# Patient Record
Sex: FEMALE | Race: WHITE | NOT HISPANIC OR LATINO | Employment: OTHER | URBAN - NONMETROPOLITAN AREA
[De-identification: names, ages, dates, MRNs, and addresses within clinical notes are randomized per-mention and may not be internally consistent; named-entity substitution may affect disease eponyms.]

---

## 2021-08-04 PROCEDURE — 78452 HT MUSCLE IMAGE SPECT MULT: CPT | Performed by: INTERNAL MEDICINE

## 2021-08-04 PROCEDURE — 93018 CV STRESS TEST I&R ONLY: CPT | Performed by: INTERNAL MEDICINE

## 2021-08-05 ENCOUNTER — OUTSIDE FACILITY SERVICE (OUTPATIENT)
Dept: CARDIOLOGY | Facility: CLINIC | Age: 76
End: 2021-08-05

## 2023-12-05 ENCOUNTER — OFFICE VISIT (OUTPATIENT)
Dept: CARDIOLOGY | Facility: CLINIC | Age: 78
End: 2023-12-05
Payer: MEDICARE

## 2023-12-05 VITALS
DIASTOLIC BLOOD PRESSURE: 80 MMHG | WEIGHT: 157 LBS | HEART RATE: 102 BPM | OXYGEN SATURATION: 96 % | SYSTOLIC BLOOD PRESSURE: 146 MMHG

## 2023-12-05 DIAGNOSIS — I25.10 CORONARY ARTERY DISEASE INVOLVING NATIVE CORONARY ARTERY OF NATIVE HEART WITHOUT ANGINA PECTORIS: Primary | ICD-10-CM

## 2023-12-05 DIAGNOSIS — I49.5 SICK SINUS SYNDROME: ICD-10-CM

## 2023-12-05 DIAGNOSIS — E78.00 PURE HYPERCHOLESTEROLEMIA: ICD-10-CM

## 2023-12-05 PROCEDURE — 99204 OFFICE O/P NEW MOD 45 MIN: CPT | Performed by: PHYSICIAN ASSISTANT

## 2023-12-05 RX ORDER — LEVOTHYROXINE SODIUM 0.07 MG/1
75 TABLET ORAL DAILY
COMMUNITY

## 2023-12-05 RX ORDER — ASPIRIN 81 MG/1
81 TABLET ORAL DAILY
COMMUNITY

## 2023-12-05 RX ORDER — VERAPAMIL HYDROCHLORIDE 360 MG/1
360 CAPSULE, DELAYED RELEASE PELLETS ORAL DAILY
COMMUNITY

## 2023-12-05 NOTE — LETTER
December 5, 2023       No Recipients    Patient: Natasha Ackerman   YOB: 1945   Date of Visit: 12/5/2023       Dear Nata Wynne MD    Natasha Ackerman was in my office today. Below is a copy of my note.    If you have questions, please do not hesitate to call me. I look forward to following Natasha along with you.         Sincerely,        MAIN Valero        CC:   No Recipients    Problem list     Subjective  Natasha Ackerman is a 78 y.o. female     Chief Complaint   Patient presents with   • Establish Care   Problem list  1.  Coronary artery disease  1.1 cardiac catheterization in 2021 with stenting times one of the second diagonal of the LAD.  Patient had abnormal stress test with an anterior ischemic defect.  Preserved LV function noted  2.  Sick sinus syndrome status post [employer] pacemaker implant in approximately 2018  3.  Hypothyroidism  4.  Hypertension    HPI    Patient is a an 78-year-old female that presents to the office to be evaluated.  Patient has seen cardiology locally with Dr. Hui's office but wants to establish care here today.    Clinically, she feels remarkable.  She does not describe any chest pain or pressure.  She does not describe any shortness of breath.  No complaints of PND or orthopnea.    She does not palpitate nor does she complain of dysrhythmic symptoms.  She is stable otherwise.      Current Outpatient Medications on File Prior to Visit   Medication Sig Dispense Refill   • Acetaminophen (TYLENOL ARTHRITIS PAIN PO) Take  by mouth.     • Ascorbic Acid (VITAMIN C ER PO) Take  by mouth.     • aspirin 81 MG EC tablet Take 1 tablet by mouth Daily.     • CALCIUM PO Take  by mouth.     • levothyroxine (SYNTHROID, LEVOTHROID) 75 MCG tablet Take 1 tablet by mouth Daily.     • verapamil ER (VERELAN) 360 MG 24 hr capsule Take 1 capsule by mouth Daily.       No current facility-administered medications on file prior to visit.       Cipro [ciprofloxacin  hcl], Azithromycin, Hydrocodone, Morphine, Noroxin [norfloxacin], Tramadol, Valium [diazepam], and Zocor [simvastatin]    Past Medical History:   Diagnosis Date   • Hypertension        Social History     Socioeconomic History   • Marital status:    Tobacco Use   • Smoking status: Never   • Smokeless tobacco: Never   Substance and Sexual Activity   • Alcohol use: Never   • Drug use: Never   • Sexual activity: Defer       Family History   Family history unknown: Yes       Review of Systems   Constitutional: Negative.    HENT: Negative.     Eyes: Negative.  Negative for visual disturbance.   Respiratory:  Positive for cough. Negative for apnea, chest tightness, shortness of breath and wheezing.    Cardiovascular: Negative.  Negative for chest pain, palpitations and leg swelling.   Gastrointestinal: Negative.  Negative for blood in stool.   Endocrine: Negative.    Genitourinary:  Negative for hematuria.   Musculoskeletal: Negative.    Skin: Negative.  Negative for color change, rash and wound.   Allergic/Immunologic: Negative.    Neurological:  Negative for dizziness, syncope, weakness, light-headedness, numbness and headaches.   Hematological:  Bruises/bleeds easily.   Psychiatric/Behavioral: Negative.  Negative for sleep disturbance.        Objective  Vitals:    12/05/23 1346   BP: 146/80   BP Location: Left arm   Patient Position: Sitting   Cuff Size: Adult   Pulse: 102   SpO2: 96%   Weight: 71.2 kg (157 lb)      /80 (BP Location: Left arm, Patient Position: Sitting, Cuff Size: Adult)   Pulse 102   Wt 71.2 kg (157 lb)   SpO2 96%     Lab Results (most recent)       None            Physical Exam  Vitals and nursing note reviewed.   Constitutional:       General: She is not in acute distress.     Appearance: Normal appearance. She is well-developed.   HENT:      Head: Normocephalic and atraumatic.   Eyes:      General: No scleral icterus.        Right eye: No discharge.         Left eye: No discharge.       Conjunctiva/sclera: Conjunctivae normal.   Neck:      Vascular: No carotid bruit.   Cardiovascular:      Rate and Rhythm: Normal rate and regular rhythm.      Heart sounds: Normal heart sounds. No murmur heard.     No friction rub. No gallop.   Pulmonary:      Effort: Pulmonary effort is normal. No respiratory distress.      Breath sounds: Normal breath sounds. No wheezing or rales.   Chest:      Chest wall: No tenderness.   Musculoskeletal:      Right lower leg: No edema.      Left lower leg: No edema.   Skin:     General: Skin is warm and dry.      Coloration: Skin is not pale.      Findings: No erythema or rash.   Neurological:      Mental Status: She is alert and oriented to person, place, and time.      Cranial Nerves: No cranial nerve deficit.   Psychiatric:         Behavior: Behavior normal.         Procedure  Procedures       Assessment & Plan    Problems Addressed this Visit          Cardiac and Vasculature    Coronary artery disease involving native coronary artery of native heart without angina pectoris - Primary    Relevant Medications    verapamil ER (VERELAN) 360 MG 24 hr capsule    Pure hypercholesterolemia    Sick sinus syndrome    Relevant Medications    verapamil ER (VERELAN) 360 MG 24 hr capsule     Diagnoses         Codes Comments    Coronary artery disease involving native coronary artery of native heart without angina pectoris    -  Primary ICD-10-CM: I25.10  ICD-9-CM: 414.01     Pure hypercholesterolemia     ICD-10-CM: E78.00  ICD-9-CM: 272.0     Sick sinus syndrome     ICD-10-CM: I49.5  ICD-9-CM: 427.81           Recommendation  1.  Patient is an 78-year-old female who presents to the office to be evaluated.  Patient has history of coronary disease but feels stable.  She has no angina.  For now, we can monitor and continue current medical regimen.  Apparently there has been some issue with statin therapy as it is listed on an allergy.  Hopefully, we can obtain some labs in regards to her  lipid status.  Medication such as Repatha can be used for lipid management.    2.  Patient with sick sinus syndrome that is doing well with pacemaker implant.  We will schedule for routine pacemaker interrogation.    3.  Patient with dyslipidemia as above.  We will monitor.  We can see her back for follow-up in 6 months or sooner if needed.  Follow-up with primary as scheduled.           Patient brought in medicine list to appointment, it's been reviewed with patient and med list was updated in the chart.      Advance Care Planning  ACP discussion was declined by the patient. Patient does not have an advance directive, declines further assistance.      Electronically signed by:

## 2023-12-05 NOTE — PROGRESS NOTES
Problem list     Subjective   Natasha Ackerman is a 78 y.o. female     Chief Complaint   Patient presents with    Establish Care   Problem list  1.  Coronary artery disease  1.1 cardiac catheterization in 2021 with stenting times one of the second diagonal of the LAD.  Patient had abnormal stress test with an anterior ischemic defect.  Preserved LV function noted  2.  Sick sinus syndrome status post [employer] pacemaker implant in approximately 2018  3.  Hypothyroidism  4.  Hypertension    HPI    Patient is a an 78-year-old female that presents to the office to be evaluated.  Patient has seen cardiology locally with Dr. Hui's office but wants to establish care here today.    Clinically, she feels remarkable.  She does not describe any chest pain or pressure.  She does not describe any shortness of breath.  No complaints of PND or orthopnea.    She does not palpitate nor does she complain of dysrhythmic symptoms.  She is stable otherwise.      Current Outpatient Medications on File Prior to Visit   Medication Sig Dispense Refill    Acetaminophen (TYLENOL ARTHRITIS PAIN PO) Take  by mouth.      Ascorbic Acid (VITAMIN C ER PO) Take  by mouth.      aspirin 81 MG EC tablet Take 1 tablet by mouth Daily.      CALCIUM PO Take  by mouth.      levothyroxine (SYNTHROID, LEVOTHROID) 75 MCG tablet Take 1 tablet by mouth Daily.      verapamil ER (VERELAN) 360 MG 24 hr capsule Take 1 capsule by mouth Daily.       No current facility-administered medications on file prior to visit.       Cipro [ciprofloxacin hcl], Azithromycin, Hydrocodone, Morphine, Noroxin [norfloxacin], Tramadol, Valium [diazepam], and Zocor [simvastatin]    Past Medical History:   Diagnosis Date    Hypertension        Social History     Socioeconomic History    Marital status:    Tobacco Use    Smoking status: Never    Smokeless tobacco: Never   Substance and Sexual Activity    Alcohol use: Never    Drug use: Never    Sexual activity: Defer        Family History   Family history unknown: Yes       Review of Systems   Constitutional: Negative.    HENT: Negative.     Eyes: Negative.  Negative for visual disturbance.   Respiratory:  Positive for cough. Negative for apnea, chest tightness, shortness of breath and wheezing.    Cardiovascular: Negative.  Negative for chest pain, palpitations and leg swelling.   Gastrointestinal: Negative.  Negative for blood in stool.   Endocrine: Negative.    Genitourinary:  Negative for hematuria.   Musculoskeletal: Negative.    Skin: Negative.  Negative for color change, rash and wound.   Allergic/Immunologic: Negative.    Neurological:  Negative for dizziness, syncope, weakness, light-headedness, numbness and headaches.   Hematological:  Bruises/bleeds easily.   Psychiatric/Behavioral: Negative.  Negative for sleep disturbance.        Objective   Vitals:    12/05/23 1346   BP: 146/80   BP Location: Left arm   Patient Position: Sitting   Cuff Size: Adult   Pulse: 102   SpO2: 96%   Weight: 71.2 kg (157 lb)      /80 (BP Location: Left arm, Patient Position: Sitting, Cuff Size: Adult)   Pulse 102   Wt 71.2 kg (157 lb)   SpO2 96%     Lab Results (most recent)       None            Physical Exam  Vitals and nursing note reviewed.   Constitutional:       General: She is not in acute distress.     Appearance: Normal appearance. She is well-developed.   HENT:      Head: Normocephalic and atraumatic.   Eyes:      General: No scleral icterus.        Right eye: No discharge.         Left eye: No discharge.      Conjunctiva/sclera: Conjunctivae normal.   Neck:      Vascular: No carotid bruit.   Cardiovascular:      Rate and Rhythm: Normal rate and regular rhythm.      Heart sounds: Normal heart sounds. No murmur heard.     No friction rub. No gallop.   Pulmonary:      Effort: Pulmonary effort is normal. No respiratory distress.      Breath sounds: Normal breath sounds. No wheezing or rales.   Chest:      Chest wall: No  tenderness.   Musculoskeletal:      Right lower leg: No edema.      Left lower leg: No edema.   Skin:     General: Skin is warm and dry.      Coloration: Skin is not pale.      Findings: No erythema or rash.   Neurological:      Mental Status: She is alert and oriented to person, place, and time.      Cranial Nerves: No cranial nerve deficit.   Psychiatric:         Behavior: Behavior normal.         Procedure   Procedures       Assessment & Plan     Problems Addressed this Visit          Cardiac and Vasculature    Coronary artery disease involving native coronary artery of native heart without angina pectoris - Primary    Relevant Medications    verapamil ER (VERELAN) 360 MG 24 hr capsule    Pure hypercholesterolemia    Sick sinus syndrome    Relevant Medications    verapamil ER (VERELAN) 360 MG 24 hr capsule     Diagnoses         Codes Comments    Coronary artery disease involving native coronary artery of native heart without angina pectoris    -  Primary ICD-10-CM: I25.10  ICD-9-CM: 414.01     Pure hypercholesterolemia     ICD-10-CM: E78.00  ICD-9-CM: 272.0     Sick sinus syndrome     ICD-10-CM: I49.5  ICD-9-CM: 427.81           Recommendation  1.  Patient is an 78-year-old female who presents to the office to be evaluated.  Patient has history of coronary disease but feels stable.  She has no angina.  For now, we can monitor and continue current medical regimen.  Apparently there has been some issue with statin therapy as it is listed on an allergy.  Hopefully, we can obtain some labs in regards to her lipid status.  Medication such as Repatha can be used for lipid management.    2.  Patient with sick sinus syndrome that is doing well with pacemaker implant.  We will schedule for routine pacemaker interrogation.    3.  Patient with dyslipidemia as above.  We will monitor.  We can see her back for follow-up in 6 months or sooner if needed.  Follow-up with primary as scheduled.           Patient brought in  medicine list to appointment, it's been reviewed with patient and med list was updated in the chart.      Advance Care Planning   ACP discussion was declined by the patient. Patient does not have an advance directive, declines further assistance.      Electronically signed by:

## 2024-01-05 ENCOUNTER — OFFICE VISIT (OUTPATIENT)
Dept: CARDIOLOGY | Facility: CLINIC | Age: 79
End: 2024-01-05
Payer: MEDICARE

## 2024-01-05 DIAGNOSIS — I49.5 SICK SINUS SYNDROME: Primary | ICD-10-CM

## 2024-05-14 ENCOUNTER — TELEPHONE (OUTPATIENT)
Dept: CARDIOLOGY | Facility: CLINIC | Age: 79
End: 2024-05-14
Payer: MEDICARE

## 2024-05-14 NOTE — TELEPHONE ENCOUNTER
Received cardiac clearance request from [facility] stating pt has L L4-5 HLD and is requiring a cardiac clearance. Placed cardiac clearance request in YAHIR'S inbox to review and address with provider.

## 2024-05-14 NOTE — LETTER
May 16, 2024           To Whom It May Concern:    We build our practice on integrity and patient care. The highest compliment we can receive is the referral of friends, family and patients to our office. We want to take this time to thank you for considering our group as part of your care team.    The medical records for Natasha Ackerman 1945 were reviewed for pre-operative clearance on 05/16/24. It has been determined that this patient has:  ACCEPTABLE RISK.    Natasha Ackerman is currently on the following medications that will need to be held prior to surgery: CONTINUE ASA IF POSSIBLE. HX OF STENTING.    This pre-operative evaluation has been completed based on patient reported medical conditions at the date of this letter, this evaluation may have considered in part results of additional testing, completion of an EKG and patient reported symptoms at the time of their visit.    Natasha Ackerman's pre-operative clearance is good for thirty(30) days from the date of this letter with no reported changes in health, symptoms or diagnosis from the patient, their primary care provider or your office.    Your office has reported the patient will under go FOR L-L4-5 HLD on TBD with [facility]. If this appointment is changed or moved outside of thirty(30) days from 05/16/24 this pre-operative clearance is void.    If you have any further questions please give our office a call.    Thank you for your trust,      MAIN Lewis

## 2024-06-05 ENCOUNTER — OFFICE VISIT (OUTPATIENT)
Dept: CARDIOLOGY | Facility: CLINIC | Age: 79
End: 2024-06-05
Payer: MEDICARE

## 2024-06-05 VITALS — DIASTOLIC BLOOD PRESSURE: 63 MMHG | OXYGEN SATURATION: 96 % | HEART RATE: 90 BPM | SYSTOLIC BLOOD PRESSURE: 122 MMHG

## 2024-06-05 DIAGNOSIS — I49.5 SICK SINUS SYNDROME: ICD-10-CM

## 2024-06-05 DIAGNOSIS — E78.00 PURE HYPERCHOLESTEROLEMIA: ICD-10-CM

## 2024-06-05 DIAGNOSIS — I25.10 CORONARY ARTERY DISEASE INVOLVING NATIVE CORONARY ARTERY OF NATIVE HEART WITHOUT ANGINA PECTORIS: Primary | ICD-10-CM

## 2024-06-05 PROCEDURE — 99214 OFFICE O/P EST MOD 30 MIN: CPT | Performed by: PHYSICIAN ASSISTANT

## 2024-06-05 RX ORDER — AMITRIPTYLINE HYDROCHLORIDE 25 MG/1
25 TABLET, FILM COATED ORAL NIGHTLY
COMMUNITY

## 2024-06-05 RX ORDER — FAMOTIDINE 40 MG/1
40 TABLET, FILM COATED ORAL DAILY
COMMUNITY

## 2024-06-05 RX ORDER — ACETAMINOPHEN AND CODEINE PHOSPHATE 300; 60 MG/1; MG/1
1 TABLET ORAL 3 TIMES DAILY
COMMUNITY
Start: 2024-05-23

## 2024-06-05 NOTE — LETTER
June 5, 2024       No Recipients    Patient: Natasha Ackerman   YOB: 1945   Date of Visit: 6/5/2024       Dear aNta Wynne MD    Natasha Ackerman was in my office today. Below is a copy of my note.    If you have questions, please do not hesitate to call me. I look forward to following Natasha along with you.         Sincerely,        MAIN Valero        CC:   No Recipients    Problem list     Subjective  Natasha Ackerman is a 78 y.o. female     Chief Complaint   Patient presents with   • 6 MONTHS     CAD     Problem list  1.  Coronary artery disease  1.1 cardiac catheterization in 2021 with stenting times one of the second diagonal of the LAD.  Patient had abnormal stress test with an anterior ischemic defect.  Preserved LV function noted  2.  Sick sinus syndrome status post [employer] pacemaker implant in approximately 2018  3.  Hypothyroidism  4.  Hypertension     HPI    Patient is an 78-year-old female who presents to the office for evaluation.  Patient does not complain of any chest pain or pressure.  No shortness of breath.  No PND or orthopnea.    No complaints of palpitations or dysrhythmic symptoms.  Patient has mild lower extremity edema.  Patient is stable otherwise.        Current Outpatient Medications on File Prior to Visit   Medication Sig Dispense Refill   • Acetaminophen (TYLENOL ARTHRITIS PAIN PO) Take  by mouth.     • acetaminophen-codeine (TYLENOL with CODEINE #4) 300-60 MG per tablet Take 1 tablet by mouth 3 times a day.     • amitriptyline (ELAVIL) 25 MG tablet Take 1 tablet by mouth Every Night.     • Ascorbic Acid (VITAMIN C ER PO) Take  by mouth.     • aspirin 81 MG EC tablet Take 1 tablet by mouth Daily.     • CALCIUM PO Take  by mouth.     • famotidine (PEPCID) 40 MG tablet Take 1 tablet by mouth Daily.     • levothyroxine (SYNTHROID, LEVOTHROID) 75 MCG tablet Take 1 tablet by mouth Daily.     • verapamil SR (CALAN-SR) 240 MG CR tablet Take 1 tablet by mouth  Daily.       No current facility-administered medications on file prior to visit.       Cipro [ciprofloxacin hcl], Azithromycin, Hydrocodone, Morphine, Noroxin [norfloxacin], Tramadol, Valium [diazepam], and Zocor [simvastatin]    Past Medical History:   Diagnosis Date   • Hypertension        Social History     Socioeconomic History   • Marital status:    Tobacco Use   • Smoking status: Never   • Smokeless tobacco: Never   Substance and Sexual Activity   • Alcohol use: Never   • Drug use: Never   • Sexual activity: Defer       Family History   Family history unknown: Yes       Review of Systems   Respiratory:  Negative for shortness of breath.    Cardiovascular:  Positive for leg swelling. Negative for chest pain and palpitations.   Neurological:  Negative for syncope.       Objective  Vitals:    06/05/24 1109   BP: 122/63   BP Location: Right arm   Patient Position: Sitting   Cuff Size: Adult   Pulse: 90   SpO2: 96%      /63 (BP Location: Right arm, Patient Position: Sitting, Cuff Size: Adult)   Pulse 90   SpO2 96%     Lab Results (most recent)       None            Physical Exam  Vitals and nursing note reviewed.   Constitutional:       General: She is not in acute distress.     Appearance: Normal appearance. She is well-developed.   HENT:      Head: Normocephalic and atraumatic.   Eyes:      General: No scleral icterus.        Right eye: No discharge.         Left eye: No discharge.      Conjunctiva/sclera: Conjunctivae normal.   Neck:      Vascular: No carotid bruit.   Cardiovascular:      Rate and Rhythm: Normal rate and regular rhythm.      Heart sounds: Normal heart sounds. No murmur heard.     No friction rub. No gallop.   Pulmonary:      Effort: Pulmonary effort is normal. No respiratory distress.      Breath sounds: Normal breath sounds. No wheezing or rales.   Chest:      Chest wall: No tenderness.   Musculoskeletal:      Right lower leg: Edema present.      Left lower leg: Edema present.    Skin:     General: Skin is warm and dry.      Coloration: Skin is not pale.      Findings: No erythema or rash.   Neurological:      Mental Status: She is alert and oriented to person, place, and time.      Cranial Nerves: No cranial nerve deficit.   Psychiatric:         Behavior: Behavior normal.         Procedure  Procedures       Assessment & Plan    Problems Addressed this Visit          Cardiac and Vasculature    Coronary artery disease involving native coronary artery of native heart without angina pectoris - Primary    Pure hypercholesterolemia    Sick sinus syndrome     Diagnoses         Codes Comments    Coronary artery disease involving native coronary artery of native heart without angina pectoris    -  Primary ICD-10-CM: I25.10  ICD-9-CM: 414.01     Sick sinus syndrome     ICD-10-CM: I49.5  ICD-9-CM: 427.81     Pure hypercholesterolemia     ICD-10-CM: E78.00  ICD-9-CM: 272.0             Recommendations  1.  Patient is an 78-year-old female presenting back for office follow-up.  Patient has no chest pain or pressure.  Patient has no dyspnea.  For now, she is doing well.  We will continue medical therapy.    2.  Regards to dyslipidemia, she cannot tolerate statin therapy.  Repatha or similar medications could be used.  She has labs performed by primary.  We would recommend statin therapy or some type of lipid medication to lower her cholesterol with known disease.    3.  Patient with sick sinus syndrome status post pacemaker implant.  She is scheduled for pacer interrogation next month.    4.  We will see her back for follow-up in 6 months or sooner as symptoms discussed.  Follow-up with primary as scheduled.         Patient did not bring med list or medicine bottles to appointment, med list has been reviewed and updated based on patient's knowledge of their meds.      Advance Care Planning  ACP discussion was declined by the patient. Patient does not have an advance directive, declines further  assistance.        Electronically signed by:

## 2024-06-05 NOTE — PROGRESS NOTES
Problem list     Subjective   Natasha Ackerman is a 78 y.o. female     Chief Complaint   Patient presents with    6 MONTHS     CAD     Problem list  1.  Coronary artery disease  1.1 cardiac catheterization in 2021 with stenting times one of the second diagonal of the LAD.  Patient had abnormal stress test with an anterior ischemic defect.  Preserved LV function noted  2.  Sick sinus syndrome status post [employer] pacemaker implant in approximately 2018  3.  Hypothyroidism  4.  Hypertension     HPI    Patient is an 78-year-old female who presents to the office for evaluation.  Patient does not complain of any chest pain or pressure.  No shortness of breath.  No PND or orthopnea.    No complaints of palpitations or dysrhythmic symptoms.  Patient has mild lower extremity edema.  Patient is stable otherwise.        Current Outpatient Medications on File Prior to Visit   Medication Sig Dispense Refill    Acetaminophen (TYLENOL ARTHRITIS PAIN PO) Take  by mouth.      acetaminophen-codeine (TYLENOL with CODEINE #4) 300-60 MG per tablet Take 1 tablet by mouth 3 times a day.      amitriptyline (ELAVIL) 25 MG tablet Take 1 tablet by mouth Every Night.      Ascorbic Acid (VITAMIN C ER PO) Take  by mouth.      aspirin 81 MG EC tablet Take 1 tablet by mouth Daily.      CALCIUM PO Take  by mouth.      famotidine (PEPCID) 40 MG tablet Take 1 tablet by mouth Daily.      levothyroxine (SYNTHROID, LEVOTHROID) 75 MCG tablet Take 1 tablet by mouth Daily.      verapamil SR (CALAN-SR) 240 MG CR tablet Take 1 tablet by mouth Daily.       No current facility-administered medications on file prior to visit.       Cipro [ciprofloxacin hcl], Azithromycin, Hydrocodone, Morphine, Noroxin [norfloxacin], Tramadol, Valium [diazepam], and Zocor [simvastatin]    Past Medical History:   Diagnosis Date    Hypertension        Social History     Socioeconomic History    Marital status:    Tobacco Use    Smoking status: Never    Smokeless  tobacco: Never   Substance and Sexual Activity    Alcohol use: Never    Drug use: Never    Sexual activity: Defer       Family History   Family history unknown: Yes       Review of Systems   Respiratory:  Negative for shortness of breath.    Cardiovascular:  Positive for leg swelling. Negative for chest pain and palpitations.   Neurological:  Negative for syncope.       Objective   Vitals:    06/05/24 1109   BP: 122/63   BP Location: Right arm   Patient Position: Sitting   Cuff Size: Adult   Pulse: 90   SpO2: 96%      /63 (BP Location: Right arm, Patient Position: Sitting, Cuff Size: Adult)   Pulse 90   SpO2 96%     Lab Results (most recent)       None            Physical Exam  Vitals and nursing note reviewed.   Constitutional:       General: She is not in acute distress.     Appearance: Normal appearance. She is well-developed.   HENT:      Head: Normocephalic and atraumatic.   Eyes:      General: No scleral icterus.        Right eye: No discharge.         Left eye: No discharge.      Conjunctiva/sclera: Conjunctivae normal.   Neck:      Vascular: No carotid bruit.   Cardiovascular:      Rate and Rhythm: Normal rate and regular rhythm.      Heart sounds: Normal heart sounds. No murmur heard.     No friction rub. No gallop.   Pulmonary:      Effort: Pulmonary effort is normal. No respiratory distress.      Breath sounds: Normal breath sounds. No wheezing or rales.   Chest:      Chest wall: No tenderness.   Musculoskeletal:      Right lower leg: Edema present.      Left lower leg: Edema present.   Skin:     General: Skin is warm and dry.      Coloration: Skin is not pale.      Findings: No erythema or rash.   Neurological:      Mental Status: She is alert and oriented to person, place, and time.      Cranial Nerves: No cranial nerve deficit.   Psychiatric:         Behavior: Behavior normal.         Procedure   Procedures       Assessment & Plan     Problems Addressed this Visit          Cardiac and  Vasculature    Coronary artery disease involving native coronary artery of native heart without angina pectoris - Primary    Pure hypercholesterolemia    Sick sinus syndrome     Diagnoses         Codes Comments    Coronary artery disease involving native coronary artery of native heart without angina pectoris    -  Primary ICD-10-CM: I25.10  ICD-9-CM: 414.01     Sick sinus syndrome     ICD-10-CM: I49.5  ICD-9-CM: 427.81     Pure hypercholesterolemia     ICD-10-CM: E78.00  ICD-9-CM: 272.0             Recommendations  1.  Patient is an 78-year-old female presenting back for office follow-up.  Patient has no chest pain or pressure.  Patient has no dyspnea.  For now, she is doing well.  We will continue medical therapy.    2.  Regards to dyslipidemia, she cannot tolerate statin therapy.  Repatha or similar medications could be used.  She has labs performed by primary.  We would recommend statin therapy or some type of lipid medication to lower her cholesterol with known disease.    3.  Patient with sick sinus syndrome status post pacemaker implant.  She is scheduled for pacer interrogation next month.    4.  We will see her back for follow-up in 6 months or sooner as symptoms discussed.  Follow-up with primary as scheduled.         Patient did not bring med list or medicine bottles to appointment, med list has been reviewed and updated based on patient's knowledge of their meds.      Advance Care Planning   ACP discussion was declined by the patient. Patient does not have an advance directive, declines further assistance.        Electronically signed by:

## 2024-07-05 ENCOUNTER — OFFICE VISIT (OUTPATIENT)
Dept: CARDIOLOGY | Facility: CLINIC | Age: 79
End: 2024-07-05
Payer: MEDICARE

## 2024-07-05 DIAGNOSIS — I49.5 SICK SINUS SYNDROME: Primary | ICD-10-CM

## 2024-07-23 ENCOUNTER — TELEPHONE (OUTPATIENT)
Dept: CARDIOLOGY | Facility: CLINIC | Age: 79
End: 2024-07-23
Payer: MEDICARE

## 2024-07-23 NOTE — LETTER
July 25, 2024       To Whom It May Concern:    We build our practice on integrity and patient care. The highest compliment we can receive is the referral of friends, family and patients to our office. We want to take this time to thank you for considering our group as part of your care team.    The medical records for Natasha Ackerman 1945 were reviewed for pre-operative clearance on 07/25/2024. It has been determined that this patient has:  ACCEPTABLE RISK.    Natasha Ackerman is currently on the following medications that will need to be held prior to surgery: NO MEDICATION'S TO HOLD. PATIENT IS TO CONTINUE ASPIRIN DAILY IF ABLE.    This pre-operative evaluation has been completed based on patient reported medical conditions at the date of this letter, this evaluation may have considered in part results of additional testing, completion of an EKG and patient reported symptoms at the time of their visit.    Natasha Ackerman's pre-operative clearance is good for thirty(30) days from the date of this letter with no reported changes in health, symptoms or diagnosis from the patient, their primary care provider or your office.    Your office has reported the patient will under go LEFT L4-5 HLD on 07/31/2024 with Dr. SARAH CARLOS. If this appointment is changed or moved outside of thirty(30) days from 07/25/2024 this pre-operative clearance is void.    If you have any further questions please give our office a call.    Thank you for your trust,      MAIN Ceballos

## 2024-07-23 NOTE — TELEPHONE ENCOUNTER
Received cardiac clearance request from  stating pt has left L4-5 HLD scheduled for 07/31/2024 and is requiring a cardiac clearance. Placed cardiac clearance request in Divya's inbox to review and address with provider.

## 2024-12-19 ENCOUNTER — OFFICE VISIT (OUTPATIENT)
Dept: CARDIOLOGY | Facility: CLINIC | Age: 79
End: 2024-12-19
Payer: MEDICARE

## 2024-12-19 VITALS — SYSTOLIC BLOOD PRESSURE: 154 MMHG | HEART RATE: 79 BPM | DIASTOLIC BLOOD PRESSURE: 79 MMHG | OXYGEN SATURATION: 96 %

## 2024-12-19 DIAGNOSIS — E78.00 PURE HYPERCHOLESTEROLEMIA: ICD-10-CM

## 2024-12-19 DIAGNOSIS — I25.10 CORONARY ARTERY DISEASE INVOLVING NATIVE CORONARY ARTERY OF NATIVE HEART WITHOUT ANGINA PECTORIS: Primary | ICD-10-CM

## 2024-12-19 DIAGNOSIS — I49.5 SICK SINUS SYNDROME: ICD-10-CM

## 2024-12-19 PROCEDURE — 99213 OFFICE O/P EST LOW 20 MIN: CPT | Performed by: PHYSICIAN ASSISTANT

## 2024-12-19 NOTE — LETTER
December 19, 2024     MD Chantel Palomares [address]  [facility] [address]    Patient: Natasha Ackerman   YOB: 1945   Date of Visit: 12/19/2024       Dear Nata Wynne MD    Natasha Ackerman was in my office today. Below is a copy of my note.    If you have questions, please do not hesitate to call me. I look forward to following Natasha along with you.         Sincerely,        MAIN Valero        CC: No Recipients    Problem list     Subjective  Natasha Ackerman is a 79 y.o. female     Chief Complaint   Patient presents with   • Follow-up     CAD     Problem list  1.  Coronary artery disease  1.1 cardiac catheterization in 2021 with stenting times one of the second diagonal of the LAD.  Patient had abnormal stress test with an anterior ischemic defect.  Preserved LV function noted  2.  Sick sinus syndrome status post [employer] pacemaker implant in approximately 2018  3.  Hypothyroidism  4.  Hypertension     HPI          Current Outpatient Medications on File Prior to Visit   Medication Sig Dispense Refill   • Acetaminophen (TYLENOL ARTHRITIS PAIN PO) Take  by mouth.     • acetaminophen-codeine (TYLENOL with CODEINE #4) 300-60 MG per tablet Take 1 tablet by mouth 3 times a day.     • amitriptyline (ELAVIL) 25 MG tablet Take 1 tablet by mouth Every Night.     • Ascorbic Acid (VITAMIN C ER PO) Take  by mouth.     • aspirin 81 MG EC tablet Take 1 tablet by mouth Daily.     • CALCIUM PO Take  by mouth.     • famotidine (PEPCID) 40 MG tablet Take 1 tablet by mouth Daily.     • levothyroxine (SYNTHROID, LEVOTHROID) 75 MCG tablet Take 1 tablet by mouth Daily.     • verapamil SR (CALAN-SR) 240 MG CR tablet Take 1 tablet by mouth Daily.       No current facility-administered medications on file prior to visit.       Cipro [ciprofloxacin hcl], Azithromycin, Hydrocodone, Morphine, Noroxin [norfloxacin], Tramadol, Valium [diazepam], and Zocor [simvastatin]    Past Medical History:    Diagnosis Date   • Hypertension        Social History     Socioeconomic History   • Marital status:    Tobacco Use   • Smoking status: Never   • Smokeless tobacco: Never   Substance and Sexual Activity   • Alcohol use: Never   • Drug use: Never   • Sexual activity: Defer       Family History   Family history unknown: Yes       Review of Systems   Constitutional:  Negative for activity change, appetite change, chills, fatigue and fever.   HENT: Negative.  Negative for congestion, rhinorrhea and sinus pain.    Eyes: Negative.  Negative for visual disturbance.   Respiratory:  Negative for apnea, cough, chest tightness, shortness of breath and wheezing.    Cardiovascular: Negative.  Negative for chest pain, palpitations and leg swelling.   Gastrointestinal: Negative.  Negative for blood in stool.   Endocrine: Negative.  Negative for cold intolerance and heat intolerance.   Genitourinary: Negative.  Negative for hematuria.   Musculoskeletal: Negative.  Negative for gait problem.   Skin: Negative.  Negative for color change, rash and wound.   Allergic/Immunologic: Negative.  Negative for environmental allergies and food allergies.   Neurological: Negative.  Negative for dizziness, syncope, weakness, light-headedness, numbness and headaches.   Hematological:  Bruises/bleeds easily.   Psychiatric/Behavioral: Negative.  Negative for sleep disturbance.        Objective  Vitals:    12/19/24 1117   BP: 154/79   BP Location: Right arm   Patient Position: Sitting   Cuff Size: Adult   Pulse: 79   SpO2: 96%      /79 (BP Location: Right arm, Patient Position: Sitting, Cuff Size: Adult)   Pulse 79   SpO2 96%     Lab Results (most recent)       None            Physical Exam    Procedure  Procedures       Assessment & Plan    Problems Addressed this Visit          Cardiac and Vasculature    Coronary artery disease involving native coronary artery of native heart without angina pectoris - Primary    Pure  hypercholesterolemia    Sick sinus syndrome     Diagnoses         Codes Comments    Coronary artery disease involving native coronary artery of native heart without angina pectoris    -  Primary ICD-10-CM: I25.10  ICD-9-CM: 414.01     Sick sinus syndrome     ICD-10-CM: I49.5  ICD-9-CM: 427.81     Pure hypercholesterolemia     ICD-10-CM: E78.00  ICD-9-CM: 272.0           Recommendations  1.  Patient is an 79-year-old female presenting for evaluation.  She feels remarkably well.  Patient does not describe any anginal anginal current symptoms.  For now, we can monitor.    2.  Patient with dyslipidemia would recommend statin therapy.  She follows with her primary care provider from that standpoint.    3.  Patient with pacemaker implant.  Patient is stable.  We can monitor for now.    4.  We will see patient back for follow-up in 6 months to year or sooner if needed.  Follow-up with primary as scheduled.           Patient did not bring med list or medicine bottles to appointment, med list has been reviewed and updated based on patient's knowledge of their meds.        Advance Care Planning  ACP discussion was declined by the patient. Patient does not have an advance directive, declines further assistance.      Electronically signed by:

## 2024-12-19 NOTE — PROGRESS NOTES
Problem list     Subjective   Natasha Ackerman is a 79 y.o. female     Chief Complaint   Patient presents with    Follow-up     CAD     Problem list  1.  Coronary artery disease  1.1 cardiac catheterization in 2021 with stenting times one of the second diagonal of the LAD.  Patient had abnormal stress test with an anterior ischemic defect.  Preserved LV function noted  2.  Sick sinus syndrome status post [employer] pacemaker implant in approximately 2018  3.  Hypothyroidism  4.  Hypertension     HPI          Current Outpatient Medications on File Prior to Visit   Medication Sig Dispense Refill    Acetaminophen (TYLENOL ARTHRITIS PAIN PO) Take  by mouth.      acetaminophen-codeine (TYLENOL with CODEINE #4) 300-60 MG per tablet Take 1 tablet by mouth 3 times a day.      amitriptyline (ELAVIL) 25 MG tablet Take 1 tablet by mouth Every Night.      Ascorbic Acid (VITAMIN C ER PO) Take  by mouth.      aspirin 81 MG EC tablet Take 1 tablet by mouth Daily.      CALCIUM PO Take  by mouth.      famotidine (PEPCID) 40 MG tablet Take 1 tablet by mouth Daily.      levothyroxine (SYNTHROID, LEVOTHROID) 75 MCG tablet Take 1 tablet by mouth Daily.      verapamil SR (CALAN-SR) 240 MG CR tablet Take 1 tablet by mouth Daily.       No current facility-administered medications on file prior to visit.       Cipro [ciprofloxacin hcl], Azithromycin, Hydrocodone, Morphine, Noroxin [norfloxacin], Tramadol, Valium [diazepam], and Zocor [simvastatin]    Past Medical History:   Diagnosis Date    Hypertension        Social History     Socioeconomic History    Marital status:    Tobacco Use    Smoking status: Never    Smokeless tobacco: Never   Substance and Sexual Activity    Alcohol use: Never    Drug use: Never    Sexual activity: Defer       Family History   Family history unknown: Yes       Review of Systems   Constitutional:  Negative for activity change, appetite change, chills, fatigue and fever.   HENT: Negative.  Negative for  congestion, rhinorrhea and sinus pain.    Eyes: Negative.  Negative for visual disturbance.   Respiratory:  Negative for apnea, cough, chest tightness, shortness of breath and wheezing.    Cardiovascular: Negative.  Negative for chest pain, palpitations and leg swelling.   Gastrointestinal: Negative.  Negative for blood in stool.   Endocrine: Negative.  Negative for cold intolerance and heat intolerance.   Genitourinary: Negative.  Negative for hematuria.   Musculoskeletal: Negative.  Negative for gait problem.   Skin: Negative.  Negative for color change, rash and wound.   Allergic/Immunologic: Negative.  Negative for environmental allergies and food allergies.   Neurological: Negative.  Negative for dizziness, syncope, weakness, light-headedness, numbness and headaches.   Hematological:  Bruises/bleeds easily.   Psychiatric/Behavioral: Negative.  Negative for sleep disturbance.        Objective   Vitals:    12/19/24 1117   BP: 154/79   BP Location: Right arm   Patient Position: Sitting   Cuff Size: Adult   Pulse: 79   SpO2: 96%      /79 (BP Location: Right arm, Patient Position: Sitting, Cuff Size: Adult)   Pulse 79   SpO2 96%     Lab Results (most recent)       None            Physical Exam    Procedure   Procedures       Assessment & Plan     Problems Addressed this Visit          Cardiac and Vasculature    Coronary artery disease involving native coronary artery of native heart without angina pectoris - Primary    Pure hypercholesterolemia    Sick sinus syndrome     Diagnoses         Codes Comments    Coronary artery disease involving native coronary artery of native heart without angina pectoris    -  Primary ICD-10-CM: I25.10  ICD-9-CM: 414.01     Sick sinus syndrome     ICD-10-CM: I49.5  ICD-9-CM: 427.81     Pure hypercholesterolemia     ICD-10-CM: E78.00  ICD-9-CM: 272.0           Recommendations  1.  Patient is an 79-year-old female presenting for evaluation.  She feels remarkably well.  Patient  does not describe any anginal anginal current symptoms.  For now, we can monitor.    2.  Patient with dyslipidemia would recommend statin therapy.  She follows with her primary care provider from that standpoint.    3.  Patient with pacemaker implant.  Patient is stable.  We can monitor for now.    4.  We will see patient back for follow-up in 6 months to year or sooner if needed.  Follow-up with primary as scheduled.           Patient did not bring med list or medicine bottles to appointment, med list has been reviewed and updated based on patient's knowledge of their meds.        Advance Care Planning   ACP discussion was declined by the patient. Patient does not have an advance directive, declines further assistance.      Electronically signed by:

## 2025-07-18 ENCOUNTER — OFFICE VISIT (OUTPATIENT)
Dept: CARDIOLOGY | Facility: CLINIC | Age: 80
End: 2025-07-18
Payer: MEDICARE

## 2025-07-18 DIAGNOSIS — I49.5 SICK SINUS SYNDROME: Primary | ICD-10-CM

## 2025-07-29 ENCOUNTER — TELEPHONE (OUTPATIENT)
Dept: CARDIOLOGY | Facility: CLINIC | Age: 80
End: 2025-07-29
Payer: MEDICARE

## 2025-07-29 NOTE — TELEPHONE ENCOUNTER
Per Dr. Lunsford's recommendations pt to f/u 2-3 weeks regarding 7/18/25 device interrogation.  Appt scheduled and an EPIC chat message sent to Daria VALDES to notify pt.

## 2025-08-05 ENCOUNTER — OFFICE VISIT (OUTPATIENT)
Dept: CARDIOLOGY | Facility: CLINIC | Age: 80
End: 2025-08-05
Payer: MEDICARE

## 2025-08-05 VITALS — DIASTOLIC BLOOD PRESSURE: 74 MMHG | HEART RATE: 93 BPM | OXYGEN SATURATION: 98 % | SYSTOLIC BLOOD PRESSURE: 140 MMHG

## 2025-08-05 DIAGNOSIS — E78.00 PURE HYPERCHOLESTEROLEMIA: ICD-10-CM

## 2025-08-05 DIAGNOSIS — I49.5 SICK SINUS SYNDROME: Primary | ICD-10-CM

## 2025-08-05 DIAGNOSIS — I25.10 CORONARY ARTERY DISEASE INVOLVING NATIVE CORONARY ARTERY OF NATIVE HEART WITHOUT ANGINA PECTORIS: ICD-10-CM

## 2025-08-05 DIAGNOSIS — I47.29 NSVT (NONSUSTAINED VENTRICULAR TACHYCARDIA): ICD-10-CM

## 2025-08-05 PROCEDURE — 99214 OFFICE O/P EST MOD 30 MIN: CPT | Performed by: PHYSICIAN ASSISTANT

## 2025-08-11 LAB
MDC_IDC_MSMT_BATTERY_REMAINING_LONGEVITY: 55 MO
MDC_IDC_MSMT_BATTERY_REMAINING_PERCENTAGE: 43 %
MDC_IDC_MSMT_BATTERY_RRT_TRIGGER: 2.6
MDC_IDC_MSMT_BATTERY_STATUS: NORMAL
MDC_IDC_MSMT_BATTERY_VOLTAGE: 2.96
MDC_IDC_MSMT_LEADCHNL_RA_IMPEDANCE_VALUE: 660
MDC_IDC_MSMT_LEADCHNL_RA_PACING_THRESHOLD_POLARITY: NORMAL
MDC_IDC_MSMT_LEADCHNL_RA_SENSING_INTR_AMPL: 3.1
MDC_IDC_MSMT_LEADCHNL_RV_IMPEDANCE_VALUE: 590
MDC_IDC_MSMT_LEADCHNL_RV_PACING_THRESHOLD_POLARITY: NORMAL
MDC_IDC_MSMT_LEADCHNL_RV_SENSING_INTR_AMPL: 9.7
MDC_IDC_PG_IMPLANT_DTM: NORMAL
MDC_IDC_PG_MFG: NORMAL
MDC_IDC_PG_MODEL: NORMAL
MDC_IDC_PG_SERIAL: NORMAL
MDC_IDC_PG_TYPE: NORMAL
MDC_IDC_SESS_DTM: NORMAL
MDC_IDC_SESS_TYPE: NORMAL
MDC_IDC_SET_BRADY_AT_MODE_SWITCH_RATE: 180
MDC_IDC_SET_BRADY_LOWRATE: 60
MDC_IDC_SET_BRADY_MAX_SENSOR_RATE: 120
MDC_IDC_SET_BRADY_MAX_TRACKING_RATE: 115
MDC_IDC_SET_BRADY_MODE: NORMAL
MDC_IDC_SET_BRADY_PAV_DELAY: 250
MDC_IDC_SET_BRADY_SAV_DELAY: 225
MDC_IDC_SET_LEADCHNL_RA_PACING_AMPLITUDE: 2
MDC_IDC_SET_LEADCHNL_RA_PACING_POLARITY: NORMAL
MDC_IDC_SET_LEADCHNL_RA_PACING_PULSEWIDTH: 0.5
MDC_IDC_SET_LEADCHNL_RA_SENSING_POLARITY: NORMAL
MDC_IDC_SET_LEADCHNL_RA_SENSING_SENSITIVITY: 0.5
MDC_IDC_SET_LEADCHNL_RV_PACING_AMPLITUDE: 2
MDC_IDC_SET_LEADCHNL_RV_PACING_POLARITY: NORMAL
MDC_IDC_SET_LEADCHNL_RV_PACING_PULSEWIDTH: 0.5
MDC_IDC_SET_LEADCHNL_RV_SENSING_POLARITY: NORMAL
MDC_IDC_SET_LEADCHNL_RV_SENSING_SENSITIVITY: 2
MDC_IDC_STAT_AT_BURDEN_PERCENT: 0
MDC_IDC_STAT_BRADY_RA_PERCENT_PACED: 3.5
MDC_IDC_STAT_BRADY_RV_PERCENT_PACED: 1